# Patient Record
Sex: MALE | Race: WHITE | NOT HISPANIC OR LATINO | URBAN - NONMETROPOLITAN AREA
[De-identification: names, ages, dates, MRNs, and addresses within clinical notes are randomized per-mention and may not be internally consistent; named-entity substitution may affect disease eponyms.]

---

## 2017-11-20 ENCOUNTER — DOCTOR'S OFFICE (OUTPATIENT)
Dept: URBAN - NONMETROPOLITAN AREA CLINIC 1 | Facility: CLINIC | Age: 72
Setting detail: OPHTHALMOLOGY
End: 2017-11-20
Payer: COMMERCIAL

## 2017-11-20 DIAGNOSIS — H18.413: ICD-10-CM

## 2017-11-20 DIAGNOSIS — H40.023: ICD-10-CM

## 2017-11-20 DIAGNOSIS — H25.13: ICD-10-CM

## 2017-11-20 DIAGNOSIS — H43.393: ICD-10-CM

## 2017-11-20 PROCEDURE — 92133 CPTRZD OPH DX IMG PST SGM ON: CPT | Performed by: OPTOMETRIST

## 2017-11-20 PROCEDURE — 92014 COMPRE OPH EXAM EST PT 1/>: CPT | Performed by: OPTOMETRIST

## 2017-11-20 ASSESSMENT — REFRACTION_MANIFEST
OD_VA1: 20/
OD_VA3: 20/
OD_VA2: 20/
OD_VA1: 20/
OS_VA1: 20/
OS_VA1: 20/
OD_VA2: 20/
OD_VA3: 20/
OS_VA2: 20/
OS_VA3: 20/
OU_VA: 20/
OS_VA3: 20/
OU_VA: 20/
OS_VA2: 20/

## 2017-11-20 ASSESSMENT — REFRACTION_CURRENTRX
OS_AXIS: 084
OD_AXIS: 099
OS_OVR_VA: 20/
OS_CYLINDER: -1.00
OD_CYLINDER: -1.50
OD_OVR_VA: 20/
OD_VPRISM_DIRECTION: PROGS
OS_OVR_VA: 20/
OS_OVR_VA: 20/
OS_ADD: +2.50
OS_SPHERE: +1.75
OD_OVR_VA: 20/
OS_VPRISM_DIRECTION: PROGS
OD_ADD: +2.50
OD_SPHERE: +2.00
OD_OVR_VA: 20/

## 2017-11-20 ASSESSMENT — REFRACTION_AUTOREFRACTION
OD_AXIS: 098
OS_SPHERE: +2.00
OS_AXIS: 078
OD_SPHERE: +2.25
OD_CYLINDER: -1.75
OS_CYLINDER: -1.25

## 2017-11-20 ASSESSMENT — REFRACTION_OUTSIDERX
OD_VA1: 20/25+2
OU_VA: 20/
OS_VA3: 20/
OD_VA3: 20/
OD_AXIS: 095
OS_CYLINDER: -1.00
OD_SPHERE: +2.50
OD_CYLINDER: -1.75
OS_VA2: 20/25+2
OS_AXIS: 080
OS_VA1: 20/25+2
OD_VA2: 20/25+2
OS_SPHERE: +2.00
OD_ADD: +2.50
OS_ADD: +2.50

## 2017-11-20 ASSESSMENT — KERATOMETRY
OD_K1POWER_DIOPTERS: 42.50
METHOD_AUTO_MANUAL: AUTO
OD_AXISANGLE_DEGREES: 123
OD_K2POWER_DIOPTERS: 43.50
OS_K2POWER_DIOPTERS: 43.75
OS_K1POWER_DIOPTERS: 43.25
OS_AXISANGLE_DEGREES: 080

## 2017-11-20 ASSESSMENT — VISUAL ACUITY
OD_BCVA: 20/30+2
OS_BCVA: 20/20-2

## 2017-11-20 ASSESSMENT — CONFRONTATIONAL VISUAL FIELD TEST (CVF)
OD_FINDINGS: FULL
OS_FINDINGS: FULL

## 2017-11-20 ASSESSMENT — SUPERFICIAL PUNCTATE KERATITIS (SPK)
OD_SPK: +1 +2
OS_SPK: T +1

## 2017-11-20 ASSESSMENT — AXIALLENGTH_DERIVED
OD_AL: 23.245
OS_AL: 23.0681

## 2017-11-20 ASSESSMENT — SPHEQUIV_DERIVED
OS_SPHEQUIV: 1.375
OD_SPHEQUIV: 1.375

## 2018-02-28 ENCOUNTER — DOCTOR'S OFFICE (OUTPATIENT)
Dept: URBAN - NONMETROPOLITAN AREA CLINIC 1 | Facility: CLINIC | Age: 73
Setting detail: OPHTHALMOLOGY
End: 2018-02-28
Payer: COMMERCIAL

## 2018-02-28 DIAGNOSIS — H25.13: ICD-10-CM

## 2018-02-28 DIAGNOSIS — H18.413: ICD-10-CM

## 2018-02-28 DIAGNOSIS — H43.393: ICD-10-CM

## 2018-02-28 DIAGNOSIS — H40.023: ICD-10-CM

## 2018-02-28 PROCEDURE — 92083 EXTENDED VISUAL FIELD XM: CPT | Performed by: OPTOMETRIST

## 2018-02-28 PROCEDURE — 92014 COMPRE OPH EXAM EST PT 1/>: CPT | Performed by: OPTOMETRIST

## 2018-02-28 ASSESSMENT — REFRACTION_MANIFEST
OS_VA1: 20/
OD_VA1: 20/
OS_VA1: 20/
OD_VA3: 20/
OU_VA: 20/
OD_VA1: 20/
OD_VA2: 20/
OS_VA3: 20/
OD_VA2: 20/
OS_VA2: 20/
OD_VA3: 20/
OU_VA: 20/
OS_VA2: 20/
OS_VA3: 20/

## 2018-02-28 ASSESSMENT — REFRACTION_CURRENTRX
OD_OVR_VA: 20/
OD_AXIS: 099
OD_OVR_VA: 20/
OS_OVR_VA: 20/
OS_OVR_VA: 20/
OD_OVR_VA: 20/
OD_SPHERE: +2.00
OS_CYLINDER: -1.00
OS_OVR_VA: 20/
OS_AXIS: 084
OD_ADD: +2.50
OD_CYLINDER: -1.50
OS_VPRISM_DIRECTION: PROGS
OS_SPHERE: +1.75
OS_ADD: +2.50
OD_VPRISM_DIRECTION: PROGS

## 2018-02-28 ASSESSMENT — REFRACTION_OUTSIDERX
OU_VA: 20/
OS_VA3: 20/
OD_VA2: 20/25+2
OD_AXIS: 095
OD_VA3: 20/
OS_VA1: 20/25+2
OS_CYLINDER: -1.00
OS_ADD: +2.50
OS_SPHERE: +2.00
OS_VA2: 20/25+2
OD_CYLINDER: -1.75
OS_AXIS: 080
OD_VA1: 20/25+2
OD_ADD: +2.50
OD_SPHERE: +2.50

## 2018-02-28 ASSESSMENT — REFRACTION_AUTOREFRACTION
OD_SPHERE: +2.25
OS_AXIS: 078
OD_AXIS: 098
OS_CYLINDER: -1.25
OD_CYLINDER: -1.75
OS_SPHERE: +2.00

## 2018-02-28 ASSESSMENT — SUPERFICIAL PUNCTATE KERATITIS (SPK)
OD_SPK: +1 +2
OS_SPK: T +1

## 2018-02-28 ASSESSMENT — SPHEQUIV_DERIVED
OS_SPHEQUIV: 1.375
OD_SPHEQUIV: 1.375

## 2018-02-28 ASSESSMENT — CONFRONTATIONAL VISUAL FIELD TEST (CVF)
OD_FINDINGS: FULL
OS_FINDINGS: FULL

## 2018-02-28 ASSESSMENT — VISUAL ACUITY
OS_BCVA: 20/20-2
OD_BCVA: 20/40

## 2018-07-10 ENCOUNTER — DOCTOR'S OFFICE (OUTPATIENT)
Dept: URBAN - NONMETROPOLITAN AREA CLINIC 1 | Facility: CLINIC | Age: 73
Setting detail: OPHTHALMOLOGY
End: 2018-07-10
Payer: COMMERCIAL

## 2018-07-10 DIAGNOSIS — H52.4: ICD-10-CM

## 2018-07-10 DIAGNOSIS — H43.393: ICD-10-CM

## 2018-07-10 DIAGNOSIS — H18.413: ICD-10-CM

## 2018-07-10 DIAGNOSIS — H52.03: ICD-10-CM

## 2018-07-10 DIAGNOSIS — H25.13: ICD-10-CM

## 2018-07-10 DIAGNOSIS — H40.023: ICD-10-CM

## 2018-07-10 PROCEDURE — 92014 COMPRE OPH EXAM EST PT 1/>: CPT | Performed by: OPTOMETRIST

## 2018-07-10 PROCEDURE — 92015 DETERMINE REFRACTIVE STATE: CPT | Performed by: OPTOMETRIST

## 2018-07-10 PROCEDURE — 92133 CPTRZD OPH DX IMG PST SGM ON: CPT | Performed by: OPTOMETRIST

## 2018-07-10 ASSESSMENT — KERATOMETRY
OD_K2POWER_DIOPTERS: 43.50
OS_K1POWER_DIOPTERS: 43.25
OD_K1POWER_DIOPTERS: 42.50
OD_AXISANGLE_DEGREES: 123
METHOD_AUTO_MANUAL: AUTO
OS_AXISANGLE_DEGREES: 080
OS_K2POWER_DIOPTERS: 43.75

## 2018-07-10 ASSESSMENT — REFRACTION_CURRENTRX
OD_AXIS: 097
OD_CYLINDER: -1.50
OD_OVR_VA: 20/
OS_SPHERE: +1.75
OD_OVR_VA: 20/
OS_AXIS: 085
OD_OVR_VA: 20/
OD_SPHERE: +2.00
OS_CYLINDER: -1.00
OD_VPRISM_DIRECTION: PROGS
OD_ADD: +2.50
OS_ADD: +2.50
OS_OVR_VA: 20/
OS_VPRISM_DIRECTION: PROGS
OS_OVR_VA: 20/
OS_OVR_VA: 20/

## 2018-07-10 ASSESSMENT — REFRACTION_MANIFEST
OU_VA: 20/
OS_VA1: 20/
OS_VA3: 20/
OS_VA2: 20/
OS_VA3: 20/
OS_VA1: 20/
OD_VA3: 20/
OD_VA1: 20/
OU_VA: 20/
OD_VA3: 20/
OD_VA2: 20/
OS_VA2: 20/
OD_VA1: 20/
OD_VA2: 20/

## 2018-07-10 ASSESSMENT — REFRACTION_OUTSIDERX
OS_VA2: 20/25+2
OS_VA1: 20/25+2
OS_CYLINDER: -1.25
OD_ADD: +2.50
OD_VA1: 20/25+2
OS_SPHERE: +2.25
OS_ADD: +2.50
OD_CYLINDER: -1.75
OU_VA: 20/
OD_SPHERE: +2.50
OS_VA3: 20/
OD_VA3: 20/
OD_AXIS: 095
OS_AXIS: 080
OD_VA2: 20/25+2

## 2018-07-10 ASSESSMENT — AXIALLENGTH_DERIVED
OD_AL: 22.9648
OS_AL: 22.8833

## 2018-07-10 ASSESSMENT — SPHEQUIV_DERIVED
OD_SPHEQUIV: 2.125
OS_SPHEQUIV: 1.875

## 2018-07-10 ASSESSMENT — REFRACTION_AUTOREFRACTION
OD_AXIS: 080
OD_SPHERE: +3.00
OD_CYLINDER: -1.75
OS_SPHERE: +2.75
OS_AXIS: 074
OS_CYLINDER: -1.75

## 2018-07-10 ASSESSMENT — SUPERFICIAL PUNCTATE KERATITIS (SPK)
OS_SPK: T +1
OD_SPK: +1 +2

## 2018-07-10 ASSESSMENT — VISUAL ACUITY
OD_BCVA: 20/30
OS_BCVA: 20/25-1

## 2018-07-10 ASSESSMENT — CONFRONTATIONAL VISUAL FIELD TEST (CVF)
OS_FINDINGS: FULL
OD_FINDINGS: FULL

## 2020-06-30 ENCOUNTER — ANESTHESIA EVENT (OUTPATIENT)
Dept: PERIOP | Facility: HOSPITAL | Age: 75
End: 2020-06-30
Payer: MEDICARE

## 2020-06-30 RX ORDER — SUCRALFATE 1 G/1
TABLET ORAL EVERY EVENING
COMMUNITY

## 2020-06-30 RX ORDER — MEMANTINE HYDROCHLORIDE 10 MG/1
10 TABLET ORAL 2 TIMES DAILY
COMMUNITY

## 2020-06-30 RX ORDER — PANTOPRAZOLE SODIUM 40 MG/1
40 TABLET, DELAYED RELEASE ORAL DAILY
COMMUNITY

## 2020-06-30 RX ORDER — DONEPEZIL HYDROCHLORIDE 10 MG/1
10 TABLET, FILM COATED ORAL
COMMUNITY

## 2020-06-30 RX ORDER — FLUOXETINE 10 MG/1
10 CAPSULE ORAL DAILY
COMMUNITY

## 2020-06-30 NOTE — PRE-PROCEDURE INSTRUCTIONS
Pre-Surgery Instructions:   Medication Instructions    donepezil (ARICEPT) 10 mg tablet Instructed patient per Anesthesia Guidelines   FLUoxetine (PROzac) 10 mg capsule Instructed patient per Anesthesia Guidelines   fluticasone-salmeterol (ADVAIR, Earl Sigalaker) 500-50 mcg/dose inhaler Instructed patient per Anesthesia Guidelines   memantine (NAMENDA) 10 mg tablet Instructed patient per Anesthesia Guidelines   pantoprazole (PROTONIX) 40 mg tablet Instructed patient per Anesthesia Guidelines   sucralfate (CARAFATE) 1 g tablet Instructed patient per Anesthesia Guidelines  Education Index     Med Instructions Troubleshoot   AcetylCholinesterase inhibitors Med Class     Continue to take this medication on your normal schedule  If this is an oral medication and you take it in the morning, then you may take this medicine with a sip of water  Antidepressant Med Class     Continue to take this medication on your normal schedule  If this is an oral medication and you take it in the morning, then you may take this medicine with a sip of water  Inhalational Med Class     Continue to take these inhaler medications on your normal schedule up to and including the day of surgery

## 2020-07-02 ENCOUNTER — APPOINTMENT (OUTPATIENT)
Dept: PREADMISSION TESTING | Facility: HOSPITAL | Age: 75
End: 2020-07-02
Payer: MEDICARE

## 2020-07-02 DIAGNOSIS — Z11.59 SCREENING FOR VIRAL DISEASE: Primary | ICD-10-CM

## 2020-07-02 PROCEDURE — U0003 INFECTIOUS AGENT DETECTION BY NUCLEIC ACID (DNA OR RNA); SEVERE ACUTE RESPIRATORY SYNDROME CORONAVIRUS 2 (SARS-COV-2) (CORONAVIRUS DISEASE [COVID-19]), AMPLIFIED PROBE TECHNIQUE, MAKING USE OF HIGH THROUGHPUT TECHNOLOGIES AS DESCRIBED BY CMS-2020-01-R: HCPCS

## 2020-07-07 NOTE — NURSING NOTE
Pre-op call initiated to review pt's instructions for procedure date of 07/08/2020  Spoke to pt's wife Lakshmi Lanier who stated she thought the surgery was scheduled for 07/09/2020 and that is what she has the arrangements made for with her family  Kiana Canales made aware, will touch base with pt once resolved

## 2020-07-08 ENCOUNTER — ANESTHESIA (OUTPATIENT)
Dept: PERIOP | Facility: HOSPITAL | Age: 75
End: 2020-07-08
Payer: MEDICARE

## 2020-07-08 LAB — SARS-COV-2 RNA SPEC QL NAA+PROBE: NOT DETECTED

## 2020-07-16 LAB — SARS-COV-2 RNA RESP QL NAA+PROBE: NEGATIVE

## 2020-07-16 PROCEDURE — 87635 SARS-COV-2 COVID-19 AMP PRB: CPT | Performed by: OTOLARYNGOLOGY

## 2020-07-21 NOTE — ANESTHESIA PREPROCEDURE EVALUATION
Review of Systems/Medical History  Patient summary reviewed        Cardiovascular  Hyperlipidemia,    Pulmonary  Smoker ex-smoker  , Asthma ,        GI/Hepatic    GERD ,        Kidney stones,        Endo/Other     GYN       Hematology   Musculoskeletal       Neurology   Psychology     Dementia           Physical Exam    Airway    Mallampati score: III  TM Distance: >3 FB  Neck ROM: full     Dental       Cardiovascular  Cardiovascular exam normal    Pulmonary  Pulmonary exam normal     Other Findings        Anesthesia Plan  ASA Score- 2     Anesthesia Type- general with ASA Monitors  Additional Monitors:   Airway Plan: LMA  Plan Factors-    Induction- intravenous  Postoperative Plan-     Informed Consent- Anesthetic plan and risks discussed with patient  I personally reviewed this patient with the CRNA  Discussed and agreed on the Anesthesia Plan with the CRNA  Laura Parker

## 2020-07-22 ENCOUNTER — HOSPITAL ENCOUNTER (OUTPATIENT)
Facility: HOSPITAL | Age: 75
Setting detail: OUTPATIENT SURGERY
Discharge: HOME | End: 2020-07-22
Attending: OTOLARYNGOLOGY | Admitting: OTOLARYNGOLOGY
Payer: MEDICARE

## 2020-07-22 VITALS
OXYGEN SATURATION: 97 % | TEMPERATURE: 97.6 F | WEIGHT: 138 LBS | HEART RATE: 80 BPM | BODY MASS INDEX: 23.56 KG/M2 | DIASTOLIC BLOOD PRESSURE: 65 MMHG | RESPIRATION RATE: 17 BRPM | SYSTOLIC BLOOD PRESSURE: 139 MMHG | HEIGHT: 64 IN

## 2020-07-22 DIAGNOSIS — C44.41 BASAL CELL CARCINOMA OF SKIN OF SCALP AND NECK: ICD-10-CM

## 2020-07-22 PROBLEM — E78.00 HYPERCHOLESTEROLEMIA: Status: ACTIVE | Noted: 2019-03-26

## 2020-07-22 PROBLEM — Z87.891 PERSONAL HISTORY OF TOBACCO USE, PRESENTING HAZARDS TO HEALTH: Status: ACTIVE | Noted: 2019-03-26

## 2020-07-22 PROBLEM — F03.90 DEMENTIA WITHOUT BEHAVIORAL DISTURBANCE (HCC): Status: ACTIVE | Noted: 2019-03-26

## 2020-07-22 PROBLEM — R45.4 IRRITABILITY: Status: ACTIVE | Noted: 2019-03-26

## 2020-07-22 PROBLEM — Z13.6 SCREENING FOR CARDIOVASCULAR CONDITION: Status: ACTIVE | Noted: 2019-03-26

## 2020-07-22 PROBLEM — L57.0 ACTINIC KERATOSIS: Status: ACTIVE | Noted: 2019-03-26

## 2020-07-22 PROBLEM — J45.40 MODERATE PERSISTENT ASTHMA WITHOUT COMPLICATION: Status: ACTIVE | Noted: 2019-03-26

## 2020-07-22 PROCEDURE — 88331 PATH CONSLTJ SURG 1 BLK 1SPC: CPT | Performed by: OTOLARYNGOLOGY

## 2020-07-22 PROCEDURE — 88331 PATH CONSLTJ SURG 1 BLK 1SPC: CPT | Performed by: PATHOLOGY

## 2020-07-22 PROCEDURE — 88305 TISSUE EXAM BY PATHOLOGIST: CPT | Performed by: PATHOLOGY

## 2020-07-22 RX ORDER — MAGNESIUM HYDROXIDE 1200 MG/15ML
LIQUID ORAL AS NEEDED
Status: DISCONTINUED | OUTPATIENT
Start: 2020-07-22 | End: 2020-07-22 | Stop reason: HOSPADM

## 2020-07-22 RX ORDER — GINSENG 100 MG
CAPSULE ORAL AS NEEDED
Status: DISCONTINUED | OUTPATIENT
Start: 2020-07-22 | End: 2020-07-22 | Stop reason: HOSPADM

## 2020-07-22 RX ORDER — CEFAZOLIN SODIUM 1 G/50ML
1000 SOLUTION INTRAVENOUS ONCE
Status: COMPLETED | OUTPATIENT
Start: 2020-07-22 | End: 2020-07-22

## 2020-07-22 RX ORDER — FENTANYL CITRATE/PF 50 MCG/ML
12.5 SYRINGE (ML) INJECTION
Status: DISCONTINUED | OUTPATIENT
Start: 2020-07-22 | End: 2020-07-22 | Stop reason: HOSPADM

## 2020-07-22 RX ORDER — DEXAMETHASONE SODIUM PHOSPHATE 10 MG/ML
INJECTION, SOLUTION INTRAMUSCULAR; INTRAVENOUS AS NEEDED
Status: DISCONTINUED | OUTPATIENT
Start: 2020-07-22 | End: 2020-07-22 | Stop reason: SURG

## 2020-07-22 RX ORDER — EPHEDRINE SULFATE 50 MG/ML
INJECTION INTRAVENOUS AS NEEDED
Status: DISCONTINUED | OUTPATIENT
Start: 2020-07-22 | End: 2020-07-22 | Stop reason: SURG

## 2020-07-22 RX ORDER — ALBUTEROL SULFATE 90 UG/1
2 AEROSOL, METERED RESPIRATORY (INHALATION) EVERY 6 HOURS PRN
COMMUNITY

## 2020-07-22 RX ORDER — FENTANYL CITRATE 50 UG/ML
INJECTION, SOLUTION INTRAMUSCULAR; INTRAVENOUS AS NEEDED
Status: DISCONTINUED | OUTPATIENT
Start: 2020-07-22 | End: 2020-07-22 | Stop reason: SURG

## 2020-07-22 RX ORDER — CEFAZOLIN SODIUM 1 G/50ML
SOLUTION INTRAVENOUS AS NEEDED
Status: DISCONTINUED | OUTPATIENT
Start: 2020-07-22 | End: 2020-07-22 | Stop reason: SURG

## 2020-07-22 RX ORDER — ONDANSETRON 2 MG/ML
INJECTION INTRAMUSCULAR; INTRAVENOUS AS NEEDED
Status: DISCONTINUED | OUTPATIENT
Start: 2020-07-22 | End: 2020-07-22 | Stop reason: SURG

## 2020-07-22 RX ORDER — ONDANSETRON 2 MG/ML
4 INJECTION INTRAMUSCULAR; INTRAVENOUS ONCE AS NEEDED
Status: DISCONTINUED | OUTPATIENT
Start: 2020-07-22 | End: 2020-07-22 | Stop reason: HOSPADM

## 2020-07-22 RX ORDER — PROPOFOL 10 MG/ML
INJECTION, EMULSION INTRAVENOUS AS NEEDED
Status: DISCONTINUED | OUTPATIENT
Start: 2020-07-22 | End: 2020-07-22 | Stop reason: SURG

## 2020-07-22 RX ORDER — LIDOCAINE HYDROCHLORIDE 10 MG/ML
INJECTION, SOLUTION EPIDURAL; INFILTRATION; INTRACAUDAL; PERINEURAL AS NEEDED
Status: DISCONTINUED | OUTPATIENT
Start: 2020-07-22 | End: 2020-07-22 | Stop reason: SURG

## 2020-07-22 RX ORDER — SODIUM CHLORIDE, SODIUM LACTATE, POTASSIUM CHLORIDE, CALCIUM CHLORIDE 600; 310; 30; 20 MG/100ML; MG/100ML; MG/100ML; MG/100ML
INJECTION, SOLUTION INTRAVENOUS CONTINUOUS PRN
Status: DISCONTINUED | OUTPATIENT
Start: 2020-07-22 | End: 2020-07-22 | Stop reason: SURG

## 2020-07-22 RX ORDER — LIDOCAINE HYDROCHLORIDE AND EPINEPHRINE 10; 10 MG/ML; UG/ML
INJECTION, SOLUTION INFILTRATION; PERINEURAL AS NEEDED
Status: DISCONTINUED | OUTPATIENT
Start: 2020-07-22 | End: 2020-07-22 | Stop reason: HOSPADM

## 2020-07-22 RX ADMIN — EPHEDRINE SULFATE 10 MG: 50 INJECTION, SOLUTION INTRAVENOUS at 08:13

## 2020-07-22 RX ADMIN — FENTANYL CITRATE 25 MCG: 50 INJECTION, SOLUTION INTRAMUSCULAR; INTRAVENOUS at 07:45

## 2020-07-22 RX ADMIN — CEFAZOLIN SODIUM 1000 MG: 1 SOLUTION INTRAVENOUS at 07:30

## 2020-07-22 RX ADMIN — LIDOCAINE HYDROCHLORIDE 50 MG: 10 INJECTION, SOLUTION EPIDURAL; INFILTRATION; INTRACAUDAL; PERINEURAL at 07:38

## 2020-07-22 RX ADMIN — PROPOFOL 130 MG: 10 INJECTION, EMULSION INTRAVENOUS at 07:38

## 2020-07-22 RX ADMIN — SODIUM CHLORIDE, SODIUM LACTATE, POTASSIUM CHLORIDE, AND CALCIUM CHLORIDE: .6; .31; .03; .02 INJECTION, SOLUTION INTRAVENOUS at 07:30

## 2020-07-22 RX ADMIN — ONDANSETRON 4 MG: 2 INJECTION INTRAMUSCULAR; INTRAVENOUS at 07:38

## 2020-07-22 RX ADMIN — FENTANYL CITRATE 25 MCG: 50 INJECTION, SOLUTION INTRAMUSCULAR; INTRAVENOUS at 07:54

## 2020-07-22 RX ADMIN — PROPOFOL 50 MG: 10 INJECTION, EMULSION INTRAVENOUS at 07:45

## 2020-07-22 RX ADMIN — CEFAZOLIN SODIUM 1000 MG: 1 SOLUTION INTRAVENOUS at 07:28

## 2020-07-22 RX ADMIN — EPHEDRINE SULFATE 10 MG: 50 INJECTION, SOLUTION INTRAVENOUS at 07:47

## 2020-07-22 RX ADMIN — DEXAMETHASONE SODIUM PHOSPHATE 4 MG: 10 INJECTION, SOLUTION INTRAMUSCULAR; INTRAVENOUS at 07:38

## 2020-07-22 NOTE — DISCHARGE SUMMARY
Discharge Summary - Annie Flores Sr  69 y o  male MRN: [id number]    Unit/Bed#: OR POOL Encounter: [id number]    Admission Date:     Admitting Diagnosis: Basal cell carcinoma of skin of scalp and neck [C44 41]    HPI: BCCA R temple excised    Procedures Performed: No orders of the defined types were placed in this encounter  Summary of Hospital Course: tolerated procedure weel    Significant Findings, Care, Treatment and Services Provided: surgical excision bcca    Complications: none    Discharge Diagnosis: BCCA R temple        Condition at Discharge: good         Discharge instructions/Information to patient and family:   See after visit summary for information provided to patient and family  Provisions for Follow-Up Care:  See after visit summary for information related to follow-up care and any pertinent home health orders  PCP: No primary care provider on file  Disposition: Home    Planned Readmission: No      Discharge Statement   I spent 15 minutes discharging the patient  This time was spent on the day of discharge  I had direct contact with the patient on the day of discharge  Additional documentation is required if more than 30 minutes were spent on discharge  Discharge Medications:  See after visit summary for reconciled discharge medications provided to patient and family

## 2020-07-22 NOTE — ANESTHESIA POSTPROCEDURE EVALUATION
Post-Op Assessment Note    CV Status:  Stable  Pain Score: 0    Pain management: satisfactory to patient     Mental Status:  Arousable and sleepy   Hydration Status:  Stable   PONV Controlled:  None   Airway Patency:  Patent   Post Op Vitals Reviewed: Yes      Staff: Anesthesiologist, CRNA           BP   131/74   Temp 97 5   Pulse  92   Resp   12   SpO2   99

## 2020-07-22 NOTE — INTERVAL H&P NOTE
H&P reviewed  After examining the patient I find no changes in the patients condition since the H&P had been written      Vitals:    07/22/20 0631   BP: 145/65   Pulse: 55   Resp: 18   Temp: 97 8 °F (36 6 °C)   SpO2: 95%

## 2020-07-22 NOTE — OP NOTE
OPERATIVE REPORT  PATIENT NAME: Johan Barker Sr     :  1945  MRN: [id number]  Pt Location: [facility] OR ROOM 02    SURGERY DATE: 2020    Surgeon(s) and Role:     * Teresita Guerin MD - Primary    Preop Diagnosis:  Basal cell carcinoma of skin of scalp and neck [C44 41]    Post-Op Diagnosis Codes: * Basal cell carcinoma of skin of scalp and neck [C44 41]    Procedure(s) (LRB):  TEMPLE EXCISION OF BASAL CELL CARCINOMA WITH ROTATION FLAP CLOSURE; FROZEN SECTION (Right)    Specimen(s):  ID Type Source Tests Collected by Time Destination   1 : RIGHT temple basal cell carcinoma with marking stitch at 12:00 frozen Tissue Head TISSUE EXAM Teresita Guerin MD 2020  7:59 AM        Estimated Blood Loss:   25 mL    Drains:  * No LDAs found *    Anesthesia Type:   Choice    Operative Indications:  Basal cell carcinoma of skin of scalp and neck [C44 41]      Technique and Operative Findings:  After the successful induction of general anesthesia and insertion of the LMA, The patient's surgical site was identified and the area around the basal cell carcinoma was prepped and draped in a sterile fashion  1% lidocaine 1 to [id number] epinephrine was then used to anesthetize the area of the excision and flap rotation  A circular incision was made around the basal cell carcinoma with a 15 blade with good margins and a silk suture was placed at the 12:00 p m  Area  Frozen section analysis showed that all margins were clear and the confirmation of basal cell carcinoma was obtained  A superiorly based rhomboid flap was then fashioned and undermined along the forehead and rotated into position  Care was taken to avoid the frontal branch of the facial nerve during the undermining process and to keep tension off the lateral canthus of the eye and upper eyelid  The transposition flap was then sewn into place with a combination of 4-0 chromic and 3-0 chromic in the dermal layer and 4-0 nylon interrupted layer in the skin  Bacitracin ointment was applied  The patient was awakened and taken to the PACU in excellent condition  Instrument and sponge counts were correct x2 at the end of the case      Complications:   None         I was present for the entire procedure    Patient Disposition:  PACU     SIGNATURE: Sergo Mcneal MD  DATE: July 22, 2020  TIME: 10:01 AM

## (undated) DEVICE — SYRINGE 10ML LL

## (undated) DEVICE — TUBING SUCTION 5MM X 12 FT

## (undated) DEVICE — SUT ETHILON 3-0 PS-1 18 IN 1663H

## (undated) DEVICE — GLOVE INDICATOR PI UNDERGLOVE SZ 8 BLUE

## (undated) DEVICE — ADHESIVE SKIN HIGH VISCOSITY EXOFIN 1ML

## (undated) DEVICE — TELFA NON-ADHERENT ABSORBENT DRESSING: Brand: TELFA

## (undated) DEVICE — SUT CHROMIC 5-0 P-3 18 IN 687G

## (undated) DEVICE — UTILITY MARKER,BLACK WITH LABELS: Brand: DEVON

## (undated) DEVICE — PAD GROUNDING ADULT

## (undated) DEVICE — BETHLEHEM UNIVERSAL MINOR GEN: Brand: CARDINAL HEALTH

## (undated) DEVICE — INTENDED FOR TISSUE SEPARATION, AND OTHER PROCEDURES THAT REQUIRE A SHARP SURGICAL BLADE TO PUNCTURE OR CUT.: Brand: BARD-PARKER SAFETY BLADES SIZE 15, STERILE

## (undated) DEVICE — PLUMEPEN PRO 10FT

## (undated) DEVICE — SUT CHROMIC 4-0 P-3 18 MM 1654G

## (undated) DEVICE — BULB SYRINGE,IRRIGATION WITH PROTECTIVE CAP: Brand: DOVER

## (undated) DEVICE — MEDI-VAC YANKAUER SUCTION HANDLE W/STRAIGHT TIP & CONTROL VENT: Brand: CARDINAL HEALTH

## (undated) DEVICE — SUT VICRYL 4-0 PS-2 27 IN J426H

## (undated) DEVICE — SUT ETHILON 4-0 PS-2 18 IN 1667H

## (undated) DEVICE — BIPOLAR CORD DISP

## (undated) DEVICE — GLOVE SRG BIOGEL ECLIPSE 8

## (undated) DEVICE — GLOVE INDICATOR PI UNDERGLOVE SZ 7.5 BLUE

## (undated) DEVICE — MINOR PROCEDURE DRAPE: Brand: CONVERTORS

## (undated) DEVICE — SPONGE STICK WITH PVP-I: Brand: KENDALL

## (undated) DEVICE — SPECIMEN CONTAINER STERILE PEEL PACK

## (undated) DEVICE — TIBURON SPLIT SHEET: Brand: CONVERTORS

## (undated) DEVICE — NEEDLE 25G X 1 1/2

## (undated) DEVICE — SUT SILK 5-0 P-3 18 IN 640G

## (undated) DEVICE — SUT CHROMIC 4-0 PS-2 18 IN 1637G

## (undated) DEVICE — SUT CHROMIC 3-0 RB-1 27 IN U204H

## (undated) DEVICE — SPONGE 4 X 4 XRAY 16 PLY STRL LF RFD